# Patient Record
(demographics unavailable — no encounter records)

---

## 2024-11-23 NOTE — DISCUSSION/SUMMARY
[FreeTextEntry1] : 47 yo  irregular menses, menorrhagia hormonal w/up sono - wnl rto for annual exam proctosone cream rectal for pruritis colorectal sx for consult colonoscopy advised mammogram

## 2024-11-23 NOTE — PHYSICAL EXAM
[Alert] : alert [Appropriately responsive] : appropriately responsive [No Acute Distress] : no acute distress [No Lymphadenopathy] : no lymphadenopathy [Soft] : soft [Non-tender] : non-tender [Non-distended] : non-distended [No HSM] : No HSM [No Lesions] : no lesions [No Mass] : no mass [Oriented x3] : oriented x3

## 2024-11-23 NOTE — HISTORY OF PRESENT ILLNESS
[FreeTextEntry1] : 47 yo G3 P-2012  LMP-  11-   Is here for initial visit , c/o recent irregular periods prolong.  no hot flashes, no night sweats, no vaginal dryness. She also c/o anal pruritis recently. [TextBox_4] : GYNHX No history of fibroids, cysts,  h/o LGSIL last pap 11/23 nl [Mammogramdate] :  [Papeardate] :  [ColonoscopyDate] : never [PGHxTotal] : 3 [[facility]xFullTerm] : 2 [[facility]] : 2 [PGHxABSpont] : 1

## 2024-11-23 NOTE — DISCUSSION/SUMMARY
[FreeTextEntry1] : 49 yo  irregular menses, menorrhagia hormonal w/up sono - wnl rto for annual exam proctosone cream rectal for pruritis colorectal sx for consult colonoscopy advised mammogram

## 2024-11-23 NOTE — HISTORY OF PRESENT ILLNESS
[FreeTextEntry1] : 49 yo G3 P-2012  LMP-  11-   Is here for initial visit , c/o recent irregular periods prolong.  no hot flashes, no night sweats, no vaginal dryness. She also c/o anal pruritis recently. [TextBox_4] : GYNHX No history of fibroids, cysts,  h/o LGSIL last pap 11/23 nl [Mammogramdate] :  [Papeardate] :  [ColonoscopyDate] : never [PGHxTotal] : 3 [[facility]] : 2 [[facility]] : 2 [PGHxABSpont] : 1

## 2024-12-03 NOTE — PHYSICAL EXAM
[Appropriately responsive] : appropriately responsive [Alert] : alert [No Acute Distress] : no acute distress [No Lymphadenopathy] : no lymphadenopathy [Soft] : soft [Non-tender] : non-tender [Non-distended] : non-distended [No HSM] : No HSM [No Lesions] : no lesions [No Mass] : no mass [Oriented x3] : oriented x3 [Examination Of The Breasts] : a normal appearance [No Discharge] : no discharge [No Masses] : no breast masses were palpable [Labia Majora] : normal [Labia Minora] : normal [Rectocele] : a rectocele [Discharge] : a  ~M vaginal discharge was present [Normal] : normal [Uterine Adnexae] : normal [FreeTextEntry4] : White discharge positive odor

## 2024-12-03 NOTE — HISTORY OF PRESENT ILLNESS
[Patient reported mammogram was normal] : Patient reported mammogram was normal [Patient reported PAP Smear was normal] : Patient reported PAP Smear was normal [Y] : Positive pregnancy history [TextBox_4] : GYNHX No history of fibroids, cysts, h/o LGSIL, history of colposcopy last pap 11/23 nl   [Mammogramdate] :  [Papeardate] :  [ColonoscopyDate] : never [PGHxTotal] : 3 [[facility]] : 2 [PGHxABSpont] : 1 [FreeTextEntry1] :  SAB 1

## 2024-12-03 NOTE — DISCUSSION/SUMMARY
[FreeTextEntry1] : 48-year-old -0-1-2 annual GYN, vaginal discharge/odor Pap HPV MetroGel Diflucan Mammogram Hormonal results reviewed Colonoscopy advised

## 2025-03-08 NOTE — HISTORY OF PRESENT ILLNESS
[Y] : Positive pregnancy history [PGHxTotal] : 3 [[facility] : 2 [FreeTextEntry1] :  , sab

## 2025-03-08 NOTE — HISTORY OF PRESENT ILLNESS
[Y] : Positive pregnancy history [PGHxTotal] : 3 [[facility]] : 2 [FreeTextEntry1] :  , sab

## 2025-03-08 NOTE — DISCUSSION/SUMMARY
[FreeTextEntry1] : 47 yo p2012 asus/hpv we discussed colposcopy for cervical dysplasia, risk of cervical cancer with high risk HPV  OPTIONS OF MONITORING VS COLPO TODAY D/W PATIENT She declined colpo at this time rto for pap 6 mo
